# Patient Record
Sex: MALE | Race: ASIAN | Employment: FULL TIME | URBAN - METROPOLITAN AREA
[De-identification: names, ages, dates, MRNs, and addresses within clinical notes are randomized per-mention and may not be internally consistent; named-entity substitution may affect disease eponyms.]

---

## 2023-12-15 ENCOUNTER — APPOINTMENT (OUTPATIENT)
Dept: GENERAL RADIOLOGY | Age: 53
DRG: 720 | End: 2023-12-15
Payer: MEDICAID

## 2023-12-15 ENCOUNTER — APPOINTMENT (OUTPATIENT)
Dept: CT IMAGING | Age: 53
DRG: 720 | End: 2023-12-15
Payer: MEDICAID

## 2023-12-15 ENCOUNTER — HOSPITAL ENCOUNTER (INPATIENT)
Age: 53
LOS: 3 days | Discharge: HOME | DRG: 720 | End: 2023-12-18
Attending: EMERGENCY MEDICINE | Admitting: INTERNAL MEDICINE
Payer: MEDICAID

## 2023-12-15 DIAGNOSIS — J10.1 INFLUENZA A: ICD-10-CM

## 2023-12-15 DIAGNOSIS — J96.01 ACUTE RESPIRATORY FAILURE WITH HYPOXIA AND HYPERCAPNIA (HCC): Primary | ICD-10-CM

## 2023-12-15 DIAGNOSIS — J96.02 ACUTE RESPIRATORY FAILURE WITH HYPOXIA AND HYPERCAPNIA (HCC): Primary | ICD-10-CM

## 2023-12-15 PROBLEM — J96.00 ACUTE RESPIRATORY FAILURE (HCC): Status: ACTIVE | Noted: 2023-12-15

## 2023-12-15 LAB
ALBUMIN SERPL-MCNC: 4.2 G/DL (ref 3.5–5.2)
ALP SERPL-CCNC: 106 U/L (ref 40–129)
ALT SERPL-CCNC: 51 U/L (ref 5–41)
ANION GAP SERPL CALCULATED.3IONS-SCNC: 13 MMOL/L (ref 9–17)
ARTERIAL PATENCY WRIST A: ABNORMAL
AST SERPL-CCNC: 32 U/L
BASOPHILS # BLD: 0.14 K/UL (ref 0–0.2)
BASOPHILS NFR BLD: 1 % (ref 0–2)
BDY SITE: ABNORMAL
BILIRUB SERPL-MCNC: 0.6 MG/DL (ref 0.3–1.2)
BNP SERPL-MCNC: 53 PG/ML
BODY TEMPERATURE: 37
BUN SERPL-MCNC: 16 MG/DL (ref 6–20)
CALCIUM SERPL-MCNC: 8.9 MG/DL (ref 8.6–10.4)
CHLORIDE SERPL-SCNC: 97 MMOL/L (ref 98–107)
CO2 SERPL-SCNC: 25 MMOL/L (ref 20–31)
COHGB MFR BLD: 1.1 % (ref 0–5)
CREAT SERPL-MCNC: 1.2 MG/DL (ref 0.7–1.2)
EOSINOPHIL # BLD: 0 K/UL (ref 0–0.4)
EOSINOPHILS RELATIVE PERCENT: 0 % (ref 0–4)
ERYTHROCYTE [DISTWIDTH] IN BLOOD BY AUTOMATED COUNT: 14.1 % (ref 11.5–14.9)
FLUAV RNA RESP QL NAA+PROBE: DETECTED
FLUBV RNA RESP QL NAA+PROBE: NOT DETECTED
GAS FLOW.O2 O2 DELIVERY SYS: ABNORMAL L/MIN
GFR SERPL CREATININE-BSD FRML MDRD: >60 ML/MIN/1.73M2
GLUCOSE SERPL-MCNC: 111 MG/DL (ref 70–99)
HCO3 ARTERIAL: 25 MMOL/L (ref 22–26)
HCT VFR BLD AUTO: 45.1 % (ref 41–53)
HGB BLD-MCNC: 14.5 G/DL (ref 13.5–17.5)
LACTATE BLDV-SCNC: 1.5 MMOL/L (ref 0.5–1.9)
LYMPHOCYTES NFR BLD: 0.55 K/UL (ref 1–4.8)
LYMPHOCYTES RELATIVE PERCENT: 4 % (ref 24–44)
MCH RBC QN AUTO: 26.3 PG (ref 26–34)
MCHC RBC AUTO-ENTMCNC: 32.2 G/DL (ref 31–37)
MCV RBC AUTO: 81.6 FL (ref 80–100)
METHEMOGLOBIN: 0.6 % (ref 0–1.9)
MONOCYTES NFR BLD: 1.24 K/UL (ref 0.1–1.3)
MONOCYTES NFR BLD: 9 % (ref 1–7)
MORPHOLOGY: NORMAL
NEGATIVE BASE EXCESS, ART: 1 MMOL/L (ref 0–2)
NEUTROPHILS NFR BLD: 86 % (ref 36–66)
NEUTS SEG NFR BLD: 11.87 K/UL (ref 1.3–9.1)
O2 SAT, ARTERIAL: 89.5 % (ref 95–98)
PCO2 ARTERIAL: 48.2 MMHG (ref 35–45)
PH ARTERIAL: 7.32 (ref 7.35–7.45)
PLATELET # BLD AUTO: 299 K/UL (ref 150–450)
PMV BLD AUTO: 7.4 FL (ref 6–12)
PO2 ARTERIAL: 60.9 MMHG (ref 80–100)
POTASSIUM SERPL-SCNC: 4.4 MMOL/L (ref 3.7–5.3)
PROCALCITONIN SERPL-MCNC: 0.19 NG/ML
PROT SERPL-MCNC: 7.8 G/DL (ref 6.4–8.3)
PT. POSITION: ABNORMAL
RBC # BLD AUTO: 5.53 M/UL (ref 4.5–5.9)
RESPIRATORY RATE: 26
SARS-COV-2 RNA RESP QL NAA+PROBE: NOT DETECTED
SODIUM SERPL-SCNC: 135 MMOL/L (ref 135–144)
SOURCE: ABNORMAL
SPECIMEN DESCRIPTION: ABNORMAL
TROPONIN I SERPL HS-MCNC: 8 NG/L (ref 0–22)
TROPONIN I SERPL HS-MCNC: 8 NG/L (ref 0–22)
WBC OTHER # BLD: 13.8 K/UL (ref 3.5–11)

## 2023-12-15 PROCEDURE — 6360000002 HC RX W HCPCS: Performed by: INTERNAL MEDICINE

## 2023-12-15 PROCEDURE — 82805 BLOOD GASES W/O2 SATURATION: CPT

## 2023-12-15 PROCEDURE — 2700000000 HC OXYGEN THERAPY PER DAY

## 2023-12-15 PROCEDURE — 84484 ASSAY OF TROPONIN QUANT: CPT

## 2023-12-15 PROCEDURE — 96375 TX/PRO/DX INJ NEW DRUG ADDON: CPT

## 2023-12-15 PROCEDURE — 83605 ASSAY OF LACTIC ACID: CPT

## 2023-12-15 PROCEDURE — 2580000003 HC RX 258: Performed by: INTERNAL MEDICINE

## 2023-12-15 PROCEDURE — 6370000000 HC RX 637 (ALT 250 FOR IP): Performed by: EMERGENCY MEDICINE

## 2023-12-15 PROCEDURE — 6360000002 HC RX W HCPCS: Performed by: EMERGENCY MEDICINE

## 2023-12-15 PROCEDURE — 96365 THER/PROPH/DIAG IV INF INIT: CPT

## 2023-12-15 PROCEDURE — 2060000000 HC ICU INTERMEDIATE R&B

## 2023-12-15 PROCEDURE — 80053 COMPREHEN METABOLIC PANEL: CPT

## 2023-12-15 PROCEDURE — 87040 BLOOD CULTURE FOR BACTERIA: CPT

## 2023-12-15 PROCEDURE — 81001 URINALYSIS AUTO W/SCOPE: CPT

## 2023-12-15 PROCEDURE — 87636 SARSCOV2 & INF A&B AMP PRB: CPT

## 2023-12-15 PROCEDURE — 85025 COMPLETE CBC W/AUTO DIFF WBC: CPT

## 2023-12-15 PROCEDURE — 99285 EMERGENCY DEPT VISIT HI MDM: CPT

## 2023-12-15 PROCEDURE — 84145 PROCALCITONIN (PCT): CPT

## 2023-12-15 PROCEDURE — 36415 COLL VENOUS BLD VENIPUNCTURE: CPT

## 2023-12-15 PROCEDURE — 2580000003 HC RX 258: Performed by: EMERGENCY MEDICINE

## 2023-12-15 PROCEDURE — 71260 CT THORAX DX C+: CPT

## 2023-12-15 PROCEDURE — 6360000004 HC RX CONTRAST MEDICATION: Performed by: EMERGENCY MEDICINE

## 2023-12-15 PROCEDURE — 36600 WITHDRAWAL OF ARTERIAL BLOOD: CPT

## 2023-12-15 PROCEDURE — 83880 ASSAY OF NATRIURETIC PEPTIDE: CPT

## 2023-12-15 PROCEDURE — 94640 AIRWAY INHALATION TREATMENT: CPT

## 2023-12-15 PROCEDURE — 71045 X-RAY EXAM CHEST 1 VIEW: CPT

## 2023-12-15 RX ORDER — ACETAMINOPHEN 500 MG
1000 TABLET ORAL ONCE
Status: COMPLETED | OUTPATIENT
Start: 2023-12-15 | End: 2023-12-15

## 2023-12-15 RX ORDER — ONDANSETRON 4 MG/1
4 TABLET, ORALLY DISINTEGRATING ORAL EVERY 8 HOURS PRN
Status: DISCONTINUED | OUTPATIENT
Start: 2023-12-15 | End: 2023-12-18 | Stop reason: HOSPADM

## 2023-12-15 RX ORDER — ACETAMINOPHEN 650 MG/1
650 SUPPOSITORY RECTAL EVERY 6 HOURS PRN
Status: DISCONTINUED | OUTPATIENT
Start: 2023-12-15 | End: 2023-12-18 | Stop reason: HOSPADM

## 2023-12-15 RX ORDER — SODIUM CHLORIDE 9 MG/ML
INJECTION, SOLUTION INTRAVENOUS CONTINUOUS
Status: ACTIVE | OUTPATIENT
Start: 2023-12-15 | End: 2023-12-17

## 2023-12-15 RX ORDER — LANOLIN ALCOHOL/MO/W.PET/CERES
400 CREAM (GRAM) TOPICAL DAILY
COMMUNITY

## 2023-12-15 RX ORDER — ALBUTEROL SULFATE 2.5 MG/3ML
2.5 SOLUTION RESPIRATORY (INHALATION)
Status: DISCONTINUED | OUTPATIENT
Start: 2023-12-15 | End: 2023-12-18 | Stop reason: HOSPADM

## 2023-12-15 RX ORDER — SODIUM CHLORIDE 9 MG/ML
INJECTION, SOLUTION INTRAVENOUS PRN
Status: CANCELLED | OUTPATIENT
Start: 2023-12-15

## 2023-12-15 RX ORDER — ENOXAPARIN SODIUM 100 MG/ML
30 INJECTION SUBCUTANEOUS 2 TIMES DAILY
Status: DISCONTINUED | OUTPATIENT
Start: 2023-12-15 | End: 2023-12-18 | Stop reason: HOSPADM

## 2023-12-15 RX ORDER — OSELTAMIVIR PHOSPHATE 75 MG/1
75 CAPSULE ORAL 2 TIMES DAILY
Status: DISCONTINUED | OUTPATIENT
Start: 2023-12-15 | End: 2023-12-18 | Stop reason: HOSPADM

## 2023-12-15 RX ORDER — MAGNESIUM SULFATE 1 G/100ML
1000 INJECTION INTRAVENOUS PRN
Status: DISCONTINUED | OUTPATIENT
Start: 2023-12-15 | End: 2023-12-18 | Stop reason: HOSPADM

## 2023-12-15 RX ORDER — 0.9 % SODIUM CHLORIDE 0.9 %
100 INTRAVENOUS SOLUTION INTRAVENOUS ONCE
Status: COMPLETED | OUTPATIENT
Start: 2023-12-15 | End: 2023-12-15

## 2023-12-15 RX ORDER — ACETAMINOPHEN 325 MG/1
650 TABLET ORAL EVERY 6 HOURS PRN
Status: CANCELLED | OUTPATIENT
Start: 2023-12-15

## 2023-12-15 RX ORDER — SODIUM CHLORIDE 0.9 % (FLUSH) 0.9 %
5-40 SYRINGE (ML) INJECTION EVERY 12 HOURS SCHEDULED
Status: DISCONTINUED | OUTPATIENT
Start: 2023-12-15 | End: 2023-12-18 | Stop reason: HOSPADM

## 2023-12-15 RX ORDER — ACETAMINOPHEN 325 MG/1
650 TABLET ORAL EVERY 6 HOURS PRN
Status: DISCONTINUED | OUTPATIENT
Start: 2023-12-15 | End: 2023-12-18 | Stop reason: HOSPADM

## 2023-12-15 RX ORDER — ACETAMINOPHEN 650 MG/1
650 SUPPOSITORY RECTAL EVERY 6 HOURS PRN
Status: CANCELLED | OUTPATIENT
Start: 2023-12-15

## 2023-12-15 RX ORDER — ONDANSETRON 2 MG/ML
4 INJECTION INTRAMUSCULAR; INTRAVENOUS EVERY 6 HOURS PRN
Status: DISCONTINUED | OUTPATIENT
Start: 2023-12-15 | End: 2023-12-18 | Stop reason: HOSPADM

## 2023-12-15 RX ORDER — POLYETHYLENE GLYCOL 3350 17 G/17G
17 POWDER, FOR SOLUTION ORAL DAILY PRN
Status: DISCONTINUED | OUTPATIENT
Start: 2023-12-15 | End: 2023-12-18 | Stop reason: HOSPADM

## 2023-12-15 RX ORDER — POTASSIUM CHLORIDE 20 MEQ/1
40 TABLET, EXTENDED RELEASE ORAL PRN
Status: DISCONTINUED | OUTPATIENT
Start: 2023-12-15 | End: 2023-12-18 | Stop reason: HOSPADM

## 2023-12-15 RX ORDER — SODIUM CHLORIDE 0.9 % (FLUSH) 0.9 %
10 SYRINGE (ML) INJECTION PRN
Status: DISCONTINUED | OUTPATIENT
Start: 2023-12-15 | End: 2023-12-18 | Stop reason: HOSPADM

## 2023-12-15 RX ORDER — SODIUM CHLORIDE 9 MG/ML
INJECTION, SOLUTION INTRAVENOUS PRN
Status: DISCONTINUED | OUTPATIENT
Start: 2023-12-15 | End: 2023-12-18 | Stop reason: HOSPADM

## 2023-12-15 RX ORDER — POTASSIUM CHLORIDE 7.45 MG/ML
10 INJECTION INTRAVENOUS PRN
Status: DISCONTINUED | OUTPATIENT
Start: 2023-12-15 | End: 2023-12-18 | Stop reason: HOSPADM

## 2023-12-15 RX ORDER — LANOLIN ALCOHOL/MO/W.PET/CERES
400 CREAM (GRAM) TOPICAL DAILY
Status: CANCELLED | OUTPATIENT
Start: 2023-12-15

## 2023-12-15 RX ORDER — ALBUTEROL SULFATE 2.5 MG/3ML
2.5 SOLUTION RESPIRATORY (INHALATION) EVERY 4 HOURS
Status: DISCONTINUED | OUTPATIENT
Start: 2023-12-16 | End: 2023-12-16

## 2023-12-15 RX ORDER — SODIUM CHLORIDE 0.9 % (FLUSH) 0.9 %
5-40 SYRINGE (ML) INJECTION EVERY 12 HOURS SCHEDULED
Status: CANCELLED | OUTPATIENT
Start: 2023-12-15

## 2023-12-15 RX ORDER — SODIUM CHLORIDE 0.9 % (FLUSH) 0.9 %
5-40 SYRINGE (ML) INJECTION PRN
Status: CANCELLED | OUTPATIENT
Start: 2023-12-15

## 2023-12-15 RX ORDER — ENOXAPARIN SODIUM 100 MG/ML
30 INJECTION SUBCUTANEOUS 2 TIMES DAILY
Status: CANCELLED | OUTPATIENT
Start: 2023-12-15

## 2023-12-15 RX ORDER — 0.9 % SODIUM CHLORIDE 0.9 %
30 INTRAVENOUS SOLUTION INTRAVENOUS ONCE
Status: COMPLETED | OUTPATIENT
Start: 2023-12-15 | End: 2023-12-15

## 2023-12-15 RX ADMIN — SODIUM CHLORIDE: 9 INJECTION, SOLUTION INTRAVENOUS at 22:04

## 2023-12-15 RX ADMIN — CEFEPIME 2000 MG: 2 INJECTION, POWDER, FOR SOLUTION INTRAVENOUS at 17:45

## 2023-12-15 RX ADMIN — SODIUM CHLORIDE 100 ML: 9 INJECTION, SOLUTION INTRAVENOUS at 16:35

## 2023-12-15 RX ADMIN — SODIUM CHLORIDE, PRESERVATIVE FREE 10 ML: 5 INJECTION INTRAVENOUS at 16:35

## 2023-12-15 RX ADMIN — OSELTAMIVIR PHOSPHATE 75 MG: 75 CAPSULE ORAL at 20:54

## 2023-12-15 RX ADMIN — ENOXAPARIN SODIUM 30 MG: 100 INJECTION SUBCUTANEOUS at 22:04

## 2023-12-15 RX ADMIN — SODIUM CHLORIDE, PRESERVATIVE FREE 10 ML: 5 INJECTION INTRAVENOUS at 22:05

## 2023-12-15 RX ADMIN — VANCOMYCIN HYDROCHLORIDE 2500 MG: 1 INJECTION, POWDER, LYOPHILIZED, FOR SOLUTION INTRAVENOUS at 18:22

## 2023-12-15 RX ADMIN — ALBUTEROL SULFATE 2.5 MG: 2.5 SOLUTION RESPIRATORY (INHALATION) at 22:29

## 2023-12-15 RX ADMIN — IOPAMIDOL 75 ML: 755 INJECTION, SOLUTION INTRAVENOUS at 16:35

## 2023-12-15 RX ADMIN — SODIUM CHLORIDE 1923 ML: 9 INJECTION, SOLUTION INTRAVENOUS at 15:43

## 2023-12-15 RX ADMIN — ACETAMINOPHEN 1000 MG: 500 TABLET ORAL at 16:11

## 2023-12-15 NOTE — ED NOTES
Patient reports SOB, fever, body aches, and lost of taste since this morning. Patient was tachycardic, febrile, and has audible wheezes and accessory muscle use.  Patient alert and oriented at this time GCS 13     Angelic Trinidad  12/15/23 8704

## 2023-12-16 ENCOUNTER — APPOINTMENT (OUTPATIENT)
Dept: GENERAL RADIOLOGY | Age: 53
DRG: 720 | End: 2023-12-16
Payer: MEDICAID

## 2023-12-16 LAB
ALBUMIN SERPL-MCNC: 4 G/DL (ref 3.5–5.2)
ALP SERPL-CCNC: 93 U/L (ref 40–129)
ALT SERPL-CCNC: 55 U/L (ref 5–41)
ANION GAP SERPL CALCULATED.3IONS-SCNC: 9 MMOL/L (ref 9–17)
AST SERPL-CCNC: 35 U/L
B PARAP IS1001 DNA NPH QL NAA+NON-PROBE: NOT DETECTED
B PERT DNA SPEC QL NAA+PROBE: NOT DETECTED
BACTERIA URNS QL MICRO: ABNORMAL
BASOPHILS # BLD: 0.1 K/UL (ref 0–0.2)
BASOPHILS NFR BLD: 1 % (ref 0–2)
BILIRUB SERPL-MCNC: 0.4 MG/DL (ref 0.3–1.2)
BILIRUB UR QL STRIP: NEGATIVE
BUN SERPL-MCNC: 15 MG/DL (ref 6–20)
C PNEUM DNA NPH QL NAA+NON-PROBE: NOT DETECTED
CALCIUM SERPL-MCNC: 8.4 MG/DL (ref 8.6–10.4)
CASTS #/AREA URNS LPF: ABNORMAL /LPF
CHLORIDE SERPL-SCNC: 103 MMOL/L (ref 98–107)
CLARITY UR: CLEAR
CO2 SERPL-SCNC: 28 MMOL/L (ref 20–31)
COLOR UR: YELLOW
CREAT SERPL-MCNC: 0.9 MG/DL (ref 0.7–1.2)
EOSINOPHIL # BLD: 0 K/UL (ref 0–0.4)
EOSINOPHILS RELATIVE PERCENT: 0 % (ref 0–4)
EPI CELLS #/AREA URNS HPF: ABNORMAL /HPF
ERYTHROCYTE [DISTWIDTH] IN BLOOD BY AUTOMATED COUNT: 14.2 % (ref 11.5–14.9)
FLUAV H3 RNA NPH QL NAA+NON-PROBE: DETECTED
FLUAV RNA NPH QL NAA+NON-PROBE: DETECTED
FLUBV RNA NPH QL NAA+NON-PROBE: NOT DETECTED
GFR SERPL CREATININE-BSD FRML MDRD: >60 ML/MIN/1.73M2
GLUCOSE SERPL-MCNC: 102 MG/DL (ref 70–99)
GLUCOSE UR STRIP-MCNC: NEGATIVE MG/DL
HADV DNA NPH QL NAA+NON-PROBE: NOT DETECTED
HCOV 229E RNA NPH QL NAA+NON-PROBE: NOT DETECTED
HCOV HKU1 RNA NPH QL NAA+NON-PROBE: NOT DETECTED
HCOV NL63 RNA NPH QL NAA+NON-PROBE: NOT DETECTED
HCOV OC43 RNA NPH QL NAA+NON-PROBE: NOT DETECTED
HCT VFR BLD AUTO: 41.2 % (ref 41–53)
HGB BLD-MCNC: 13.5 G/DL (ref 13.5–17.5)
HGB UR QL STRIP.AUTO: ABNORMAL
HMPV RNA NPH QL NAA+NON-PROBE: NOT DETECTED
HPIV1 RNA NPH QL NAA+NON-PROBE: NOT DETECTED
HPIV2 RNA NPH QL NAA+NON-PROBE: NOT DETECTED
HPIV3 RNA NPH QL NAA+NON-PROBE: NOT DETECTED
HPIV4 RNA NPH QL NAA+NON-PROBE: NOT DETECTED
INR PPP: 1.1
KETONES UR STRIP-MCNC: ABNORMAL MG/DL
L PNEUMO1 AG UR QL IA.RAPID: NEGATIVE
LEUKOCYTE ESTERASE UR QL STRIP: NEGATIVE
LYMPHOCYTES NFR BLD: 0.7 K/UL (ref 1–4.8)
LYMPHOCYTES RELATIVE PERCENT: 6 % (ref 24–44)
M PNEUMO DNA NPH QL NAA+NON-PROBE: NOT DETECTED
MCH RBC QN AUTO: 26.9 PG (ref 26–34)
MCHC RBC AUTO-ENTMCNC: 32.9 G/DL (ref 31–37)
MCV RBC AUTO: 81.9 FL (ref 80–100)
MONOCYTES NFR BLD: 1.6 K/UL (ref 0.1–1.3)
MONOCYTES NFR BLD: 14 % (ref 1–7)
NEUTROPHILS NFR BLD: 79 % (ref 36–66)
NEUTS SEG NFR BLD: 9.2 K/UL (ref 1.3–9.1)
NITRITE UR QL STRIP: NEGATIVE
PH UR STRIP: 5 [PH] (ref 5–8)
PLATELET # BLD AUTO: 259 K/UL (ref 150–450)
PMV BLD AUTO: 7.6 FL (ref 6–12)
POTASSIUM SERPL-SCNC: 4.2 MMOL/L (ref 3.7–5.3)
PROT SERPL-MCNC: 7.3 G/DL (ref 6.4–8.3)
PROT UR STRIP-MCNC: ABNORMAL MG/DL
PROTHROMBIN TIME: 14.5 SEC (ref 11.8–14.6)
RBC # BLD AUTO: 5.03 M/UL (ref 4.5–5.9)
RBC #/AREA URNS HPF: ABNORMAL /HPF
RSV ANTIGEN: NEGATIVE
RSV RNA NPH QL NAA+NON-PROBE: NOT DETECTED
RV+EV RNA NPH QL NAA+NON-PROBE: NOT DETECTED
S PNEUM AG SPEC QL: NEGATIVE
SARS-COV-2 RNA NPH QL NAA+NON-PROBE: NOT DETECTED
SODIUM SERPL-SCNC: 140 MMOL/L (ref 135–144)
SP GR UR STRIP: 1.03 (ref 1–1.03)
SPECIMEN DESCRIPTION: ABNORMAL
SPECIMEN SOURCE: NORMAL
SPECIMEN SOURCE: NORMAL
TSH SERPL DL<=0.05 MIU/L-ACNC: 0.5 UIU/ML (ref 0.3–5)
UROBILINOGEN UR STRIP-ACNC: NORMAL EU/DL (ref 0–1)
WBC #/AREA URNS HPF: ABNORMAL /HPF
WBC OTHER # BLD: 11.6 K/UL (ref 3.5–11)

## 2023-12-16 PROCEDURE — 94761 N-INVAS EAR/PLS OXIMETRY MLT: CPT

## 2023-12-16 PROCEDURE — 86738 MYCOPLASMA ANTIBODY: CPT

## 2023-12-16 PROCEDURE — 6360000002 HC RX W HCPCS: Performed by: INTERNAL MEDICINE

## 2023-12-16 PROCEDURE — 85610 PROTHROMBIN TIME: CPT

## 2023-12-16 PROCEDURE — 87899 AGENT NOS ASSAY W/OPTIC: CPT

## 2023-12-16 PROCEDURE — 94664 DEMO&/EVAL PT USE INHALER: CPT

## 2023-12-16 PROCEDURE — 93005 ELECTROCARDIOGRAM TRACING: CPT | Performed by: INTERNAL MEDICINE

## 2023-12-16 PROCEDURE — 87070 CULTURE OTHR SPECIMN AEROBIC: CPT

## 2023-12-16 PROCEDURE — 87449 NOS EACH ORGANISM AG IA: CPT

## 2023-12-16 PROCEDURE — 2580000003 HC RX 258: Performed by: INTERNAL MEDICINE

## 2023-12-16 PROCEDURE — 71045 X-RAY EXAM CHEST 1 VIEW: CPT

## 2023-12-16 PROCEDURE — 87807 RSV ASSAY W/OPTIC: CPT

## 2023-12-16 PROCEDURE — 87205 SMEAR GRAM STAIN: CPT

## 2023-12-16 PROCEDURE — 99223 1ST HOSP IP/OBS HIGH 75: CPT | Performed by: INTERNAL MEDICINE

## 2023-12-16 PROCEDURE — 6370000000 HC RX 637 (ALT 250 FOR IP): Performed by: INTERNAL MEDICINE

## 2023-12-16 PROCEDURE — 87641 MR-STAPH DNA AMP PROBE: CPT

## 2023-12-16 PROCEDURE — 2060000000 HC ICU INTERMEDIATE R&B

## 2023-12-16 PROCEDURE — 2580000003 HC RX 258

## 2023-12-16 PROCEDURE — 2700000000 HC OXYGEN THERAPY PER DAY

## 2023-12-16 PROCEDURE — 85025 COMPLETE CBC W/AUTO DIFF WBC: CPT

## 2023-12-16 PROCEDURE — 84443 ASSAY THYROID STIM HORMONE: CPT

## 2023-12-16 PROCEDURE — 94640 AIRWAY INHALATION TREATMENT: CPT

## 2023-12-16 PROCEDURE — 6370000000 HC RX 637 (ALT 250 FOR IP): Performed by: EMERGENCY MEDICINE

## 2023-12-16 PROCEDURE — 6370000000 HC RX 637 (ALT 250 FOR IP)

## 2023-12-16 PROCEDURE — 80053 COMPREHEN METABOLIC PANEL: CPT

## 2023-12-16 PROCEDURE — 0202U NFCT DS 22 TRGT SARS-COV-2: CPT

## 2023-12-16 PROCEDURE — 36415 COLL VENOUS BLD VENIPUNCTURE: CPT

## 2023-12-16 RX ORDER — HYDROCHLOROTHIAZIDE 25 MG/1
25 TABLET ORAL DAILY
Status: DISCONTINUED | OUTPATIENT
Start: 2023-12-16 | End: 2023-12-16

## 2023-12-16 RX ORDER — HYDROXYZINE HYDROCHLORIDE 10 MG/1
10 TABLET, FILM COATED ORAL 3 TIMES DAILY PRN
Status: DISCONTINUED | OUTPATIENT
Start: 2023-12-16 | End: 2023-12-18 | Stop reason: HOSPADM

## 2023-12-16 RX ORDER — HYDRALAZINE HYDROCHLORIDE 20 MG/ML
5 INJECTION INTRAMUSCULAR; INTRAVENOUS EVERY 6 HOURS PRN
Status: DISCONTINUED | OUTPATIENT
Start: 2023-12-16 | End: 2023-12-18 | Stop reason: HOSPADM

## 2023-12-16 RX ORDER — CARVEDILOL 6.25 MG/1
6.25 TABLET ORAL 2 TIMES DAILY WITH MEALS
Status: DISCONTINUED | OUTPATIENT
Start: 2023-12-16 | End: 2023-12-18 | Stop reason: HOSPADM

## 2023-12-16 RX ORDER — ALBUTEROL SULFATE 2.5 MG/3ML
2.5 SOLUTION RESPIRATORY (INHALATION)
Status: DISCONTINUED | OUTPATIENT
Start: 2023-12-16 | End: 2023-12-18 | Stop reason: HOSPADM

## 2023-12-16 RX ADMIN — SODIUM CHLORIDE, PRESERVATIVE FREE 10 ML: 5 INJECTION INTRAVENOUS at 08:26

## 2023-12-16 RX ADMIN — ALBUTEROL SULFATE 2.5 MG: 2.5 SOLUTION RESPIRATORY (INHALATION) at 10:45

## 2023-12-16 RX ADMIN — OSELTAMIVIR PHOSPHATE 75 MG: 75 CAPSULE ORAL at 20:39

## 2023-12-16 RX ADMIN — CARVEDILOL 6.25 MG: 6.25 TABLET, FILM COATED ORAL at 10:51

## 2023-12-16 RX ADMIN — ENOXAPARIN SODIUM 30 MG: 100 INJECTION SUBCUTANEOUS at 08:26

## 2023-12-16 RX ADMIN — ACETAMINOPHEN 650 MG: 325 TABLET ORAL at 20:39

## 2023-12-16 RX ADMIN — ALBUTEROL SULFATE 2.5 MG: 2.5 SOLUTION RESPIRATORY (INHALATION) at 00:00

## 2023-12-16 RX ADMIN — ALBUTEROL SULFATE 2.5 MG: 2.5 SOLUTION RESPIRATORY (INHALATION) at 08:34

## 2023-12-16 RX ADMIN — SODIUM CHLORIDE: 9 INJECTION, SOLUTION INTRAVENOUS at 18:14

## 2023-12-16 RX ADMIN — ALBUTEROL SULFATE 2.5 MG: 2.5 SOLUTION RESPIRATORY (INHALATION) at 13:08

## 2023-12-16 RX ADMIN — ALBUTEROL SULFATE 2.5 MG: 2.5 SOLUTION RESPIRATORY (INHALATION) at 03:52

## 2023-12-16 RX ADMIN — CARVEDILOL 6.25 MG: 6.25 TABLET, FILM COATED ORAL at 17:28

## 2023-12-16 RX ADMIN — HYDROXYZINE HYDROCHLORIDE 10 MG: 10 TABLET ORAL at 18:05

## 2023-12-16 RX ADMIN — ALBUTEROL SULFATE 2.5 MG: 2.5 SOLUTION RESPIRATORY (INHALATION) at 20:05

## 2023-12-16 RX ADMIN — VANCOMYCIN HYDROCHLORIDE 1000 MG: 1 INJECTION, POWDER, LYOPHILIZED, FOR SOLUTION INTRAVENOUS at 08:29

## 2023-12-16 RX ADMIN — CEFEPIME 2000 MG: 2 INJECTION, POWDER, FOR SOLUTION INTRAVENOUS at 10:52

## 2023-12-16 RX ADMIN — ENOXAPARIN SODIUM 30 MG: 100 INJECTION SUBCUTANEOUS at 20:40

## 2023-12-16 RX ADMIN — OSELTAMIVIR PHOSPHATE 75 MG: 75 CAPSULE ORAL at 09:21

## 2023-12-16 RX ADMIN — ALBUTEROL SULFATE 2.5 MG: 2.5 SOLUTION RESPIRATORY (INHALATION) at 15:51

## 2023-12-16 RX ADMIN — CEFEPIME 2000 MG: 2 INJECTION, POWDER, FOR SOLUTION INTRAVENOUS at 02:00

## 2023-12-16 NOTE — PROGRESS NOTES
Physical Therapy Cancel Note      DATE: 2023    NAME: Ryan Link  MRN: [id number]   : 1970      Patient not seen this date for Physical Therapy due to: Other: Hold due to current medical status -gets extremely SOB with activity.       Electronically signed by Jm Lizarraga PT DPT on 2023 at 3:00 PM

## 2023-12-16 NOTE — H&P
4.5 - 5.9 m/uL    Hemoglobin 14.5 13.5 - 17.5 g/dL    Hematocrit 45.1 41 - 53 %    MCV 81.6 80 - 100 fL    MCH 26.3 26 - 34 pg    MCHC 32.2 31 - 37 g/dL    RDW 14.1 11.5 - 14.9 %    Platelets 868 985 - 633 k/uL    MPV 7.4 6.0 - 12.0 fL    Neutrophils % 86 (H) 36 - 66 %    Lymphocytes % 4 (L) 24 - 44 %    Monocytes % 9 (H) 1 - 7 %    Eosinophils % 0 0 - 4 %    Basophils % 1 0 - 2 %    Neutrophils Absolute 11.87 (H) 1.3 - 9.1 k/uL    Lymphocytes Absolute 0.55 (L) 1.0 - 4.8 k/uL    Monocytes Absolute 1.24 0.1 - 1.3 k/uL    Eosinophils Absolute 0.00 0.0 - 0.4 k/uL    Basophils Absolute 0.14 0.0 - 0.2 k/uL    Morphology Normal    Comprehensive Metabolic Panel    Collection Time: 12/15/23  3:21 PM   Result Value Ref Range    Sodium 135 135 - 144 mmol/L    Potassium 4.4 3.7 - 5.3 mmol/L    Chloride 97 (L) 98 - 107 mmol/L    CO2 25 20 - 31 mmol/L    Anion Gap 13 9 - 17 mmol/L    Glucose 111 (H) 70 - 99 mg/dL    BUN 16 6 - 20 mg/dL    Creatinine 1.2 0.7 - 1.2 mg/dL    Est, Glom Filt Rate >60 >60 mL/min/1.73m2    Calcium 8.9 8.6 - 10.4 mg/dL    Total Protein 7.8 6.4 - 8.3 g/dL    Albumin 4.2 3.5 - 5.2 g/dL    Total Bilirubin 0.6 0.3 - 1.2 mg/dL    Alkaline Phosphatase 106 40 - 129 U/L    ALT 51 (H) 5 - 41 U/L    AST 32 <40 U/L   Procalcitonin    Collection Time: 12/15/23  3:21 PM   Result Value Ref Range    Procalcitonin 0.19 (H) <0.09 ng/mL   Troponin    Collection Time: 12/15/23  3:21 PM   Result Value Ref Range    Troponin, High Sensitivity 8 0 - 22 ng/L   Lactate, Sepsis    Collection Time: 12/15/23  3:22 PM   Result Value Ref Range    Lactic Acid, Sepsis 1.5 0.5 - 1.9 mmol/L   Culture, Blood 2    Collection Time: 12/15/23  3:35 PM    Specimen: Blood   Result Value Ref Range    Specimen Description . BLOOD RIGHT HAND     Special Requests          Culture NO GROWTH 12 HOURS    COVID-19 & Influenza Combo    Collection Time: 12/15/23  3:35 PM    Specimen: Nasopharyngeal Swab   Result Value Ref Range    Specimen Description [facility] NASOPHARYNGEAL SWAB     Source . NASOPHARYNGEAL SWAB     SARS-CoV-2 RNA, RT PCR Not Detected Not Detected    INFLUENZA A DETECTED (A) Not Detected    INFLUENZA B Not Detected Not Detected   Troponin    Collection Time: 12/15/23  4:57 PM   Result Value Ref Range    Troponin, High Sensitivity 8 0 - 22 ng/L   Brain Natriuretic Peptide    Collection Time: 12/15/23  4:57 PM   Result Value Ref Range    Pro-BNP 53 <300 pg/mL   Arterial Blood Gases    Collection Time: 12/15/23  5:20 PM   Result Value Ref Range    pH, Arterial 7.324 (L) 7.350 - 7.450    pCO2, Arterial 48.2 (HH) 35.0 - 45.0 mmHg    pO2, Arterial 60.9 (L) 80.0 - 100.0 mmHg    HCO3, Arterial 25.0 22.0 - 26.0 mmol/L    Negative Base Excess, Art 1.0 0.0 - 2.0 mmol/L    O2 Sat, Arterial 89.5 (L) 95 - 98 %    Carboxyhemoglobin 1.1 0 - 5 %    Methemoglobin 0.6 0.0 - 1.9 %    Pt Temp 37     O2 Device/Flow/% CANNULA 6LPM     Respiratory Rate 26     Jose C Test PASS     Sample Site Right Radial Artery     Pt.  Position SEMI-FOWLERS    Protime-INR    Collection Time: 12/16/23  4:05 AM   Result Value Ref Range    Protime 14.5 11.8 - 14.6 sec    INR 1.1    CBC with Auto Differential    Collection Time: 12/16/23  4:05 AM   Result Value Ref Range    WBC 11.6 (H) 3.5 - 11.0 k/uL    RBC 5.03 4.5 - 5.9 m/uL    Hemoglobin 13.5 13.5 - 17.5 g/dL    Hematocrit 41.2 41 - 53 %    MCV 81.9 80 - 100 fL    MCH 26.9 26 - 34 pg    MCHC 32.9 31 - 37 g/dL    RDW 14.2 11.5 - 14.9 %    Platelets 331 965 - 328 k/uL    MPV 7.6 6.0 - 12.0 fL    Neutrophils % 79 (H) 36 - 66 %    Lymphocytes % 6 (L) 24 - 44 %    Monocytes % 14 (H) 1 - 7 %    Eosinophils % 0 0 - 4 %    Basophils % 1 0 - 2 %    Neutrophils Absolute 9.20 (H) 1.3 - 9.1 k/uL    Lymphocytes Absolute 0.70 (L) 1.0 - 4.8 k/uL    Monocytes Absolute 1.60 (H) 0.1 - 1.3 k/uL    Eosinophils Absolute 0.00 0.0 - 0.4 k/uL    Basophils Absolute 0.10 0.0 - 0.2 k/uL   Comprehensive Metabolic Panel    Collection Time: 12/16/23  4:05 AM   Result Value

## 2023-12-16 NOTE — CONSULTS
[facility]   CONSULT NOTE:      DATE OF CONSULT 12/16/2023    REASON FOR CONSULTATION:  Shortness of breath      PCP No primary care provider on file. CHIEF COMPLAINT: I have been sick since Thursday    HISTORY OF PRESENT ILLNESS:     79-year-old male. Presented to the hospital because of nausea vomiting headache. He had generalized malaise. He works as a  worker at [address] [place] [address].  His symptoms worsened and he started having shortness of breath wheezing chest tightness. He was seen in the emergency department with a temperature of 99.6. Patient had a white count of 13.8 no evidence of any bandemia or eosinophilia. Procalcitonin level 0.19. COVID test influenza AB test negative influenza A test positive. Patient did undergo CT scan which revealed no pulm embolism. Reportedly he was on a high flow overnight. We were able to decrease his O2 requirements and currently is on 4 L nasal count with O2 saturation is 97%. He does feel better with breathing treatments. He does have a smoking history but quit back in the mid 90s    He does not follow with a physician on a regular basis. He does not have a medical history. ALLERGIES:  No Known Allergies    HOME MEDICATIONS:  Medications Prior to Admission: magnesium oxide (MAG-OX) 400 (240 Mg) MG tablet, Take 1 tablet by mouth daily      PAST MEDICAL HISTORY:  History reviewed. No pertinent past medical history. PAST SURGICAL HISTORY:  History reviewed. No pertinent surgical history.        SOCIAL HISTORY:  Social History     Socioeconomic History    Marital status:      Spouse name: Not on file    Number of children: Not on file    Years of education: Not on file    Highest education level: Not on file   Occupational History    Not on file   Tobacco Use    Smoking status: Former     Current packs/day: 0.50     Average packs/day: 0.5 packs/day for 5.0 years (2.5 ttl pk-yrs)     Types:

## 2023-12-16 NOTE — ED NOTES
Bedside report given to Carter Thakur RN from ICU. Tamiflu given.  Transferred to 2014 per cart on monitor accompanied by RN in stable condition     Malik Bustillo RN  12/15/23 2100

## 2023-12-16 NOTE — PROGRESS NOTES
Up to bsc for bm and very sob with exertion, pt anxious. Satting low 90'ss pm 4L NC, increased to 5 and called RT Thakur Sessions for requested tx. Residents aware pt anxious and unable to catch breath. Writer asked for something for anxiety. Orders received. Pt back to bed and bp 190/118. 189/109. Receiving treatment.

## 2023-12-16 NOTE — PROGRESS NOTES
12/16/23 1444   Encounter Summary   Encounter Overview/Reason  Initial Encounter   Service Provided For: Patient   Referral/Consult From: Rounding   Complexity of Encounter Low   Spiritual/Emotional needs   Type Spiritual Support   Assessment/Intervention/Outcome   Assessment Unable to assess  (isolation)   Intervention Prayer (assurance of)/[place]

## 2023-12-16 NOTE — ACP (ADVANCE CARE PLANNING)
Advance Care Planning     Advance Care Planning Activator (Inpatient)  Conversation Note      Date of ACP Conversation: 12/16/2023     Conversation Conducted with: Patient with Decision Making Capacity    ACP Activator: Luis Eduardo Martinez RN    Health Care Decision Maker:     Current Designated Health Care Decision Maker:     Click here to complete Healthcare Decision Makers including section of the Healthcare Decision Maker Relationship (ie \"Primary\")  Today we documented Decision Maker(s) consistent with Legal Next of Kin hierarchy. Care Preferences    Ventilation: \"If you were in your present state of health and suddenly became very ill and were unable to breathe on your own, what would your preference be about the use of a ventilator (breathing machine) if it were available to you? \"      Would the patient desire the use of ventilator (breathing machine)?: yes    \"If your health worsens and it becomes clear that your chance of recovery is unlikely, what would your preference be about the use of a ventilator (breathing machine) if it were available to you? \"     Would the patient desire the use of ventilator (breathing machine)?: No      Resuscitation  \"CPR works best to restart the heart when there is a sudden event, like a heart attack, in someone who is otherwise healthy. Unfortunately, CPR does not typically restart the heart for people who have serious health conditions or who are very sick. \"    \"In the event your heart stopped as a result of an underlying serious health condition, would you want attempts to be made to restart your heart (answer \"yes\" for attempt to resuscitate) or would you prefer a natural death (answer \"no\" for do not attempt to resuscitate)? \" yes       [] Yes   [x] No   Educated Patient / Eulalia Dang regarding differences between Advance Directives and portable DNR orders.     Length of ACP Conversation in minutes:      Conversation Outcomes:  ACP discussion completed    Follow-up

## 2023-12-16 NOTE — ED NOTES
ICU called, notified ready for admission. Pt A/OX4 sitting on side of bed, SOB at rest, High Flow O2 continues at 15 lpm, lungs with exp wheezes, diminished. Diaphoretic. Denies c/o at this time. States family aware he is being admitted.       Courtney Tan, RN  12/15/23 2047

## 2023-12-16 NOTE — PROGRESS NOTES
Nutrition Note    Nutrition screen received due to Cultural, Zoroastrianism, or Ethnic Food Preferences - No Pork. Diet order updated. Alexa Cheng R.D., L.D.   Phone: [phone]

## 2023-12-16 NOTE — PROGRESS NOTES
Vancomycin Dosing by Pharmacy - Daily Note   Vancomycin Therapy Day:  2  Indication: sepsis    Allergies:  Patient has no known allergies. Actual Weight:    Wt Readings from Last 1 Encounters:   12/16/23 108 kg (238 lb 1.6 oz)       Labs/Ancillary Data  Estimated Creatinine Clearance: 108 mL/min (based on SCr of 0.9 mg/dL). Recent Labs     12/15/23  1521 12/16/23  0405   CREATININE 1.2 0.9   BUN 16 15   WBC 13.8* 11.6*     Procalcitonin   Date Value Ref Range Status   12/15/2023 0.19 (H) <0.09 ng/mL Final     Comment:           Suspected Sepsis:  <0.50 ng/mL     Low likelihood of sepsis. 0.50-2.00 ng/mL     Increased likelihood of sepsis. Antibiotics encouraged. >2.00 ng/mL     High risk of sepsis/shock. Antibiotics strongly encouraged. Suspected Lower Resp Tract Infections:  <0.24 ng/mL     Low likelihood of bacterial infection. >0.24 ng/mL     Increased likelihood of bacterial infection. Antibiotics encouraged. With successful antibiotic therapy, PCT levels should decrease rapidly. (Half-life of 24 to   36 hours.)        Procalcitonin values from samples collected within the first 6 hours of systemic infection   may still be low. Retesting may be indicated. Values from day 1 and day 4 can be entered into the Change in Procalcitonin Calculator   (www.TickTickTicketss-pct-calculator. com) to determine the patient's Mortality Risk Prognosis        In healthy neonates, plasma Procalcitonin (PCT) concentrations increase gradually after   birth, reaching peak values at about 24 hours of age then decrease to normal values below   0.5 ng/mL by 48-72 hours of age.          Intake/Output Summary (Last 24 hours) at 12/16/2023 0710  Last data filed at 12/16/2023 0700  Gross per 24 hour   Intake 946.37 ml   Output 600 ml   Net 346.37 ml     Temp: 98.1    Culture Date / Source  /  Results  See micro  Recent vancomycin administrations                     vancomycin (VANCOCIN) 2,500 mg in sodium chloride 0.9 % 500 mL 1.79

## 2023-12-16 NOTE — CARE COORDINATION
Case Management Assessment  Initial Evaluation    Date/Time of Evaluation: 12/16/2023 12:55 PM  Assessment Completed by: Jesenia Reyez RN    If patient is discharged prior to next notation, then this note serves as note for discharge by case management. Patient Name: [facility]                   YOB: 1970  Diagnosis: Acute respiratory failure ([address] ) [J96.00]  Influenza A [J10.1]  Acute respiratory failure with hypoxia and hypercapnia ([address] [facility]) [J96.01, J96.02]                   Date / Time: 12/15/2023  2:52 PM    Patient Admission Status: Inpatient   Readmission Risk (Low < 19, Mod (19-27), High > 27): Readmission Risk Score: 10.5    Current PCP: No primary care provider on file. PCP verified by CM? Yes    Chart Reviewed: Yes      History Provided by: Patient  Patient Orientation: Alert and Oriented    Patient Cognition: Alert    Hospitalization in the last 30 days (Readmission):  No    If yes, Readmission Assessment in CM Navigator will be completed. Advance Directives:      Code Status: Full Code   Patient's Primary Decision Maker is: Legal Next of Kin      Discharge Planning:    Patient lives with: Spouse/Significant Other, Children Type of Home: House  Primary Care Giver: Self  Patient Support Systems include: Spouse/Significant Other   Current Financial resources: Medicaid  Current community resources: None  Current services prior to admission: None            Current DME:              Type of Home Care services:  None    ADLS  Prior functional level: Independent in ADLs/IADLs  Current functional level: Independent in ADLs/IADLs    PT AM-PAC:   /24  OT AM-PAC:   /24    Family can provide assistance at DC: Yes  Would you like Case Management to discuss the discharge plan with any other family members/significant others, and if so, who?  No  Plans to Return to Present Housing: Yes  Other Identified Issues/Barriers to RETURNING to current housing: NA  Potential Assistance needed at

## 2023-12-16 NOTE — PROGRESS NOTES
Vancomycin Dosing by Pharmacy - Initial Note   TODAY'S DATE:  12/15/2023  Patient name, age:  Kanu Lim, 46 y.o. Indication: Sepsis of unknown origin, empiric, CAP  Additional antimicrobials:  Cefepime    Allergies:  Patient has no known allergies. Actual Weight:    Wt Readings from Last 1 Encounters:   12/15/23 104.3 kg (230 lb)     Labs/Ancillary Data  Estimated Creatinine Clearance: 80 mL/min (based on SCr of 1.2 mg/dL). Recent Labs     12/15/23  1521   CREATININE 1.2   BUN 16   WBC 13.8*     Procalcitonin   Date Value Ref Range Status   12/15/2023 0.19 (H) <0.09 ng/mL Final     Comment:           Suspected Sepsis:  <0.50 ng/mL     Low likelihood of sepsis. 0.50-2.00 ng/mL     Increased likelihood of sepsis. Antibiotics encouraged. >2.00 ng/mL     High risk of sepsis/shock. Antibiotics strongly encouraged. Suspected Lower Resp Tract Infections:  <0.24 ng/mL     Low likelihood of bacterial infection. >0.24 ng/mL     Increased likelihood of bacterial infection. Antibiotics encouraged. With successful antibiotic therapy, PCT levels should decrease rapidly. (Half-life of 24 to   36 hours.)        Procalcitonin values from samples collected within the first 6 hours of systemic infection   may still be low. Retesting may be indicated. Values from day 1 and day 4 can be entered into the Change in Procalcitonin Calculator   (www.INDOMs-pct-calculator. [employer]) to determine the patient's Mortality Risk Prognosis        In healthy neonates, plasma Procalcitonin (PCT) concentrations increase gradually after   birth, reaching peak values at about 24 hours of age then decrease to normal values below   0.5 ng/mL by 48-72 hours of age.        No intake or output data in the 24 hours ending 12/15/23 2153  Temp: 99.6 F    Recent vancomycin administrations                     vancomycin (VANCOCIN) 2,500 mg in sodium chloride 0.9 % 500 mL IVPB (mg) 2,500 mg New Bag 12/15/23 1822                  Culture Date / Kirstie Julian  /  Results  See micro    MRSA Nasal Swab: N/A. Non-respiratory infection. Marylou Rubio PLAN   Initial loading dose of 25mg/kg (max of 2,500 mg) = 2500  mg  x 1, then  vancomycin 1000 mg IV every 12 hours. Ensured BUN/sCr ordered at baseline and every 48 hours x at least 3 levels, then at least weekly. Vancomycin level ordered for 12/17 @ 0500. Vancomycin Target Concentration Parameters  Treatment  Population Target AUC/TRINITY Target Trough   Invasive MRSA Infection (bacteremia, pneumonia, meningitis, endocarditis, osteomyelitis)  Sepsis (undifferentiated) 400-600 N/A   Infection due to non-MRSA pathogen  Empiric treatment of non-invasive MRSA infection  (SSTI, UTI) <500 10-15 mg/L   CrCl < 29 mL/min  Rapidly fluctuating serum creatinine   GHULAM N/A < 15 mg/L     Renal replacement therapy is dosed by levels, per hospital protocol. Abbreviations  * Pauc: probability that AUC is >400 (efficacy); Pconc: probability that Ctrough is above 20 ?g/mL (toxicity); Tox: Probability of nephrotoxicity, based on Ozzei et al. Clin Infect Dis 2009. Loading dose: 2500 mg at 18:22 12/15/2023. Regimen: 1000 mg IV every 12 hours. Start time: 06:00 on 12/16/2023  Exposure target: AUC24 (range)400-600 mg/L.hr   AUC24,ss: 513 mg/L.hr  Probability of AUC24 > 400: 85 %  Ctrough,ss: 15.9 mg/L  Probability of Ctrough,ss > 20: 22 %  Probability of nephrotoxicity (Ozzie MARY 2009): 11 %    Thank you for the consult. Pharmacy will continue to follow. Aneita Claw Hollie Phoenix. Ph.  12/15/2023  10:02 PM

## 2023-12-16 NOTE — PROGRESS NOTES
Pharmacy Medication History Note      List of current medications patient is taking is complete. Source of information: patient, dispense report, OARRS    Changes made to medication list:  Medications flagged for removal (include reason, ex. noncompliance):  None     Medications removed (include reason, ex. therapy complete or physician discontinued):  None    Medications added/doses adjusted:  Magnesium oxide 400 mg daily     Other notes (ex. Recent course of antibiotics, Coumadin dosing):  Patient denies taking any prescription medications. OARRS negative     Denies use of other OTC or herbal medications.       Allergies clarified    Medication list provided to the patient: no   Medication education provided to the patient: none      Electronically signed by Lauri [facility] on 12/15/2023 at 7:09 PM

## 2023-12-17 LAB
ANION GAP SERPL CALCULATED.3IONS-SCNC: 9 MMOL/L (ref 9–17)
BUN SERPL-MCNC: 15 MG/DL (ref 6–20)
CALCIUM SERPL-MCNC: 8 MG/DL (ref 8.6–10.4)
CHLORIDE SERPL-SCNC: 104 MMOL/L (ref 98–107)
CHOLEST SERPL-MCNC: 168 MG/DL
CHOLESTEROL/HDL RATIO: 4.9
CO2 SERPL-SCNC: 28 MMOL/L (ref 20–31)
CREAT SERPL-MCNC: 0.8 MG/DL (ref 0.7–1.2)
ERYTHROCYTE [DISTWIDTH] IN BLOOD BY AUTOMATED COUNT: 14.4 % (ref 11.5–14.9)
EST. AVERAGE GLUCOSE BLD GHB EST-MCNC: 123 MG/DL
GFR SERPL CREATININE-BSD FRML MDRD: >60 ML/MIN/1.73M2
GLUCOSE SERPL-MCNC: 102 MG/DL (ref 70–99)
HBA1C MFR BLD: 5.9 % (ref 4–6)
HCT VFR BLD AUTO: 39.8 % (ref 41–53)
HDLC SERPL-MCNC: 34 MG/DL
HGB BLD-MCNC: 12.6 G/DL (ref 13.5–17.5)
LDLC SERPL CALC-MCNC: 103 MG/DL (ref 0–130)
MCH RBC QN AUTO: 26 PG (ref 26–34)
MCHC RBC AUTO-ENTMCNC: 31.6 G/DL (ref 31–37)
MCV RBC AUTO: 82.3 FL (ref 80–100)
MICROORGANISM SPEC CULT: NORMAL
MICROORGANISM/AGENT SPEC: NORMAL
MRSA, DNA, NASAL: NEGATIVE
PLATELET # BLD AUTO: 234 K/UL (ref 150–450)
PMV BLD AUTO: 7.6 FL (ref 6–12)
POTASSIUM SERPL-SCNC: 4.1 MMOL/L (ref 3.7–5.3)
PROCALCITONIN SERPL-MCNC: 0.35 NG/ML
RBC # BLD AUTO: 4.83 M/UL (ref 4.5–5.9)
SODIUM SERPL-SCNC: 141 MMOL/L (ref 135–144)
SPECIMEN DESCRIPTION: NORMAL
SPECIMEN DESCRIPTION: NORMAL
TRIGL SERPL-MCNC: 153 MG/DL
WBC OTHER # BLD: 7.1 K/UL (ref 3.5–11)

## 2023-12-17 PROCEDURE — 2060000000 HC ICU INTERMEDIATE R&B

## 2023-12-17 PROCEDURE — 94640 AIRWAY INHALATION TREATMENT: CPT

## 2023-12-17 PROCEDURE — 80048 BASIC METABOLIC PNL TOTAL CA: CPT

## 2023-12-17 PROCEDURE — 94761 N-INVAS EAR/PLS OXIMETRY MLT: CPT

## 2023-12-17 PROCEDURE — 36415 COLL VENOUS BLD VENIPUNCTURE: CPT

## 2023-12-17 PROCEDURE — 6360000002 HC RX W HCPCS: Performed by: INTERNAL MEDICINE

## 2023-12-17 PROCEDURE — 6370000000 HC RX 637 (ALT 250 FOR IP)

## 2023-12-17 PROCEDURE — 6370000000 HC RX 637 (ALT 250 FOR IP): Performed by: EMERGENCY MEDICINE

## 2023-12-17 PROCEDURE — 2700000000 HC OXYGEN THERAPY PER DAY

## 2023-12-17 PROCEDURE — 83036 HEMOGLOBIN GLYCOSYLATED A1C: CPT

## 2023-12-17 PROCEDURE — 6370000000 HC RX 637 (ALT 250 FOR IP): Performed by: INTERNAL MEDICINE

## 2023-12-17 PROCEDURE — 97165 OT EVAL LOW COMPLEX 30 MIN: CPT

## 2023-12-17 PROCEDURE — 99232 SBSQ HOSP IP/OBS MODERATE 35: CPT | Performed by: INTERNAL MEDICINE

## 2023-12-17 PROCEDURE — 84145 PROCALCITONIN (PCT): CPT

## 2023-12-17 PROCEDURE — 85027 COMPLETE CBC AUTOMATED: CPT

## 2023-12-17 PROCEDURE — 80061 LIPID PANEL: CPT

## 2023-12-17 RX ORDER — GUAIFENESIN 600 MG/1
600 TABLET, EXTENDED RELEASE ORAL 2 TIMES DAILY
Status: DISCONTINUED | OUTPATIENT
Start: 2023-12-17 | End: 2023-12-18 | Stop reason: HOSPADM

## 2023-12-17 RX ADMIN — OSELTAMIVIR PHOSPHATE 75 MG: 75 CAPSULE ORAL at 22:21

## 2023-12-17 RX ADMIN — ALBUTEROL SULFATE 2.5 MG: 2.5 SOLUTION RESPIRATORY (INHALATION) at 11:24

## 2023-12-17 RX ADMIN — ENOXAPARIN SODIUM 30 MG: 100 INJECTION SUBCUTANEOUS at 21:51

## 2023-12-17 RX ADMIN — CARVEDILOL 6.25 MG: 6.25 TABLET, FILM COATED ORAL at 17:47

## 2023-12-17 RX ADMIN — ENOXAPARIN SODIUM 30 MG: 100 INJECTION SUBCUTANEOUS at 09:48

## 2023-12-17 RX ADMIN — OSELTAMIVIR PHOSPHATE 75 MG: 75 CAPSULE ORAL at 10:23

## 2023-12-17 RX ADMIN — ALBUTEROL SULFATE 2.5 MG: 2.5 SOLUTION RESPIRATORY (INHALATION) at 19:06

## 2023-12-17 RX ADMIN — ACETAMINOPHEN 650 MG: 325 TABLET ORAL at 15:18

## 2023-12-17 RX ADMIN — ALBUTEROL SULFATE 2.5 MG: 2.5 SOLUTION RESPIRATORY (INHALATION) at 08:00

## 2023-12-17 RX ADMIN — ALBUTEROL SULFATE 2.5 MG: 2.5 SOLUTION RESPIRATORY (INHALATION) at 15:50

## 2023-12-17 RX ADMIN — CARVEDILOL 6.25 MG: 6.25 TABLET, FILM COATED ORAL at 09:48

## 2023-12-17 RX ADMIN — GUAIFENESIN 600 MG: 600 TABLET, EXTENDED RELEASE ORAL at 09:48

## 2023-12-17 RX ADMIN — GUAIFENESIN 600 MG: 600 TABLET, EXTENDED RELEASE ORAL at 21:51

## 2023-12-17 NOTE — PLAN OF CARE
Problem: Discharge Planning  Goal: Discharge to home or other facility with appropriate resources  12/17/2023 1416 by Zoila Gutierrez RN  Outcome: Progressing  Flowsheets (Taken 12/17/2023 0945 by Deisy Alexander, INÉS)  Discharge to home or other facility with appropriate resources: Identify barriers to discharge with patient and caregiver  12/17/2023 0530 by Cary Felix RN  Outcome: Progressing  12/17/2023 0529 by Cary Felix RN  Outcome: Progressing  Flowsheets (Taken 12/16/2023 1655 by Louisa Hughes RN)  Discharge to home or other facility with appropriate resources:   Identify barriers to discharge with patient and caregiver   Arrange for needed discharge resources and transportation as appropriate   Identify discharge learning needs (meds, wound care, etc)     Problem: Skin/Tissue Integrity  Goal: Absence of new skin breakdown  Description: 1. Monitor for areas of redness and/or skin breakdown  2. Assess vascular access sites hourly  3. Every 4-6 hours minimum:  Change oxygen saturation probe site  4. Every 4-6 hours:  If on nasal continuous positive airway pressure, respiratory therapy assess nares and determine need for appliance change or resting period.   12/17/2023 1416 by Zoila Gutierrez RN  Outcome: Progressing  12/17/2023 0530 by Cary Felix RN  Outcome: Progressing  12/17/2023 0529 by Cary Felix RN  Outcome: Progressing     Problem: Safety - Adult  Goal: Free from fall injury  12/17/2023 1416 by Zoila Gutierrez RN  Outcome: Progressing  12/17/2023 0530 by Cary Felix RN  Outcome: Progressing  12/17/2023 0529 by Cary Felix RN  Outcome: Progressing     Problem: Chronic Conditions and Co-morbidities  Goal: Patient's chronic conditions and co-morbidity symptoms are monitored and maintained or improved  12/17/2023 1416 by Zoila Gutierrez RN  Outcome: Progressing  Flowsheets (Taken 12/17/2023 0959 by Deisy Alexander RN)  Care Plan - Patient's Chronic Conditions and Co-Morbidity Symptoms are

## 2023-12-17 NOTE — PLAN OF CARE
Problem: Discharge Planning  Goal: Discharge to home or other facility with appropriate resources  12/17/2023 0530 by Jerrell Pavon RN  Outcome: Progressing  12/17/2023 0529 by Jerrell Pavon RN  Outcome: Progressing  Flowsheets (Taken 12/16/2023 1655 by Geri Gordon RN)  Discharge to home or other facility with appropriate resources:   Identify barriers to discharge with patient and caregiver   Arrange for needed discharge resources and transportation as appropriate   Identify discharge learning needs (meds, wound care, etc)     Problem: Skin/Tissue Integrity  Goal: Absence of new skin breakdown  Description: 1. Monitor for areas of redness and/or skin breakdown  2. Assess vascular access sites hourly  3. Every 4-6 hours minimum:  Change oxygen saturation probe site  4. Every 4-6 hours:  If on nasal continuous positive airway pressure, respiratory therapy assess nares and determine need for appliance change or resting period.   12/17/2023 0530 by Jerrell Pavon RN  Outcome: Progressing  12/17/2023 0529 by Jerrell Pavon RN  Outcome: Progressing     Problem: Safety - Adult  Goal: Free from fall injury  12/17/2023 0530 by Jerrell Pavon RN  Outcome: Progressing  12/17/2023 0529 by Jerrell Pavon RN  Outcome: Progressing     Problem: Chronic Conditions and Co-morbidities  Goal: Patient's chronic conditions and co-morbidity symptoms are monitored and maintained or improved  Outcome: Progressing     Problem: Pain  Goal: Verbalizes/displays adequate comfort level or baseline comfort level  Outcome: Progressing

## 2023-12-18 VITALS
TEMPERATURE: 98.3 F | OXYGEN SATURATION: 95 % | HEIGHT: 66 IN | SYSTOLIC BLOOD PRESSURE: 138 MMHG | WEIGHT: 239.64 LBS | RESPIRATION RATE: 16 BRPM | BODY MASS INDEX: 38.51 KG/M2 | HEART RATE: 82 BPM | DIASTOLIC BLOOD PRESSURE: 95 MMHG

## 2023-12-18 LAB
ANION GAP SERPL CALCULATED.3IONS-SCNC: 9 MMOL/L (ref 9–17)
BUN SERPL-MCNC: 12 MG/DL (ref 6–20)
CALCIUM SERPL-MCNC: 8.4 MG/DL (ref 8.6–10.4)
CHLORIDE SERPL-SCNC: 101 MMOL/L (ref 98–107)
CO2 SERPL-SCNC: 29 MMOL/L (ref 20–31)
CREAT SERPL-MCNC: 0.7 MG/DL (ref 0.7–1.2)
EKG ATRIAL RATE: 101 BPM
EKG P AXIS: 60 DEGREES
EKG P-R INTERVAL: 210 MS
EKG Q-T INTERVAL: 304 MS
EKG QRS DURATION: 76 MS
EKG QTC CALCULATION (BAZETT): 394 MS
EKG R AXIS: 61 DEGREES
EKG T AXIS: 39 DEGREES
EKG VENTRICULAR RATE: 101 BPM
ERYTHROCYTE [DISTWIDTH] IN BLOOD BY AUTOMATED COUNT: 13.7 % (ref 11.5–14.9)
GFR SERPL CREATININE-BSD FRML MDRD: >60 ML/MIN/1.73M2
GLUCOSE SERPL-MCNC: 95 MG/DL (ref 70–99)
HCT VFR BLD AUTO: 39.2 % (ref 41–53)
HGB BLD-MCNC: 12.7 G/DL (ref 13.5–17.5)
M PNEUMO IGM SER QL IA: 0.24
MCH RBC QN AUTO: 26.2 PG (ref 26–34)
MCHC RBC AUTO-ENTMCNC: 32.4 G/DL (ref 31–37)
MCV RBC AUTO: 80.8 FL (ref 80–100)
PLATELET # BLD AUTO: 262 K/UL (ref 150–450)
PMV BLD AUTO: 7.5 FL (ref 6–12)
POTASSIUM SERPL-SCNC: 3.5 MMOL/L (ref 3.7–5.3)
RBC # BLD AUTO: 4.85 M/UL (ref 4.5–5.9)
SODIUM SERPL-SCNC: 139 MMOL/L (ref 135–144)
WBC OTHER # BLD: 5.9 K/UL (ref 3.5–11)

## 2023-12-18 PROCEDURE — 36415 COLL VENOUS BLD VENIPUNCTURE: CPT

## 2023-12-18 PROCEDURE — 85027 COMPLETE CBC AUTOMATED: CPT

## 2023-12-18 PROCEDURE — 6370000000 HC RX 637 (ALT 250 FOR IP): Performed by: EMERGENCY MEDICINE

## 2023-12-18 PROCEDURE — 2580000003 HC RX 258: Performed by: INTERNAL MEDICINE

## 2023-12-18 PROCEDURE — 6370000000 HC RX 637 (ALT 250 FOR IP): Performed by: INTERNAL MEDICINE

## 2023-12-18 PROCEDURE — 80048 BASIC METABOLIC PNL TOTAL CA: CPT

## 2023-12-18 PROCEDURE — 6360000002 HC RX W HCPCS: Performed by: INTERNAL MEDICINE

## 2023-12-18 PROCEDURE — 94640 AIRWAY INHALATION TREATMENT: CPT

## 2023-12-18 PROCEDURE — 99239 HOSP IP/OBS DSCHRG MGMT >30: CPT | Performed by: INTERNAL MEDICINE

## 2023-12-18 PROCEDURE — 6370000000 HC RX 637 (ALT 250 FOR IP)

## 2023-12-18 RX ORDER — CARVEDILOL 6.25 MG/1
6.25 TABLET ORAL 2 TIMES DAILY WITH MEALS
Qty: 60 TABLET | Refills: 3 | Status: SHIPPED | OUTPATIENT
Start: 2023-12-18

## 2023-12-18 RX ORDER — GUAIFENESIN 600 MG/1
600 TABLET, EXTENDED RELEASE ORAL 2 TIMES DAILY
Qty: 30 TABLET | Refills: 0 | Status: SHIPPED | OUTPATIENT
Start: 2023-12-18

## 2023-12-18 RX ORDER — OSELTAMIVIR PHOSPHATE 75 MG/1
75 CAPSULE ORAL 2 TIMES DAILY
Qty: 4 CAPSULE | Refills: 0 | Status: SHIPPED | OUTPATIENT
Start: 2023-12-18 | End: 2023-12-20

## 2023-12-18 RX ORDER — DOXYCYCLINE HYCLATE 100 MG
100 TABLET ORAL 2 TIMES DAILY
Qty: 14 TABLET | Refills: 0 | Status: SHIPPED | OUTPATIENT
Start: 2023-12-18 | End: 2023-12-25

## 2023-12-18 RX ADMIN — CARVEDILOL 6.25 MG: 6.25 TABLET, FILM COATED ORAL at 09:55

## 2023-12-18 RX ADMIN — OSELTAMIVIR PHOSPHATE 75 MG: 75 CAPSULE ORAL at 09:55

## 2023-12-18 RX ADMIN — GUAIFENESIN 600 MG: 600 TABLET, EXTENDED RELEASE ORAL at 09:55

## 2023-12-18 RX ADMIN — SODIUM CHLORIDE, PRESERVATIVE FREE 10 ML: 5 INJECTION INTRAVENOUS at 10:00

## 2023-12-18 RX ADMIN — ENOXAPARIN SODIUM 30 MG: 100 INJECTION SUBCUTANEOUS at 09:56

## 2023-12-18 RX ADMIN — POTASSIUM CHLORIDE 40 MEQ: 1500 TABLET, EXTENDED RELEASE ORAL at 06:25

## 2023-12-18 RX ADMIN — ALBUTEROL SULFATE 2.5 MG: 2.5 SOLUTION RESPIRATORY (INHALATION) at 07:58

## 2023-12-18 RX ADMIN — ALBUTEROL SULFATE 2.5 MG: 2.5 SOLUTION RESPIRATORY (INHALATION) at 12:36

## 2023-12-18 RX ADMIN — ALBUTEROL SULFATE 2.5 MG: 2.5 SOLUTION RESPIRATORY (INHALATION) at 15:36

## 2023-12-18 NOTE — DISCHARGE INSTRUCTIONS
Follow up with your new primary care physician, Dr. Yfn Gruber  in 7 days  Follow up with Dr. Maryam Mitchell (Pulmonology) in 4 weeks  Take all your medication as prescribed:  Take 1 capsule of 75 mg oseltamivir (TAMIFLU) twice daily for 4 doses  Take 1 tablet of 100 mg doxycycline hyclate (VIBRA-TABS) twice daily for 7 days  Take 1 tablet of 6.25 mg carvedilol (COREG) twice daily with meals  If systolic blood pressure is 100 or less, do not take carvedilol (COREG)  If your prescription has refills, obtain the medication refills from the pharmacy before you run out. Seek medical attention if you run out of a medication you need and do not have any refills of.   Reasons to call your doctor or go to the ER: shortness of breath, chest pain, loss of consciousness, or any other concerning symptoms.

## 2023-12-18 NOTE — PROGRESS NOTES
CLINICAL PHARMACY NOTE: MEDS TO BEDS    Total # of Prescriptions Filled: 4   The following medications were delivered to the patient:  Mucus Relief 600mg  Doxycycline 100mg  Olseltamivir 75mg  Carvedilol 6.25    Additional Documentation:  Delivered medications to patients room

## 2023-12-18 NOTE — DISCHARGE INSTR - DIET

## 2023-12-18 NOTE — PLAN OF CARE
Problem: Discharge Planning  Goal: Discharge to home or other facility with appropriate resources  12/18/2023 0316 by Rodney Roldan RN  Outcome: Progressing     Problem: Skin/Tissue Integrity  Goal: Absence of new skin breakdown  Description: 1. Monitor for areas of redness and/or skin breakdown  2. Assess vascular access sites hourly  3. Every 4-6 hours minimum:  Change oxygen saturation probe site  4. Every 4-6 hours:  If on nasal continuous positive airway pressure, respiratory therapy assess nares and determine need for appliance change or resting period.   12/18/2023 0316 by Rodney Roldan RN  Outcome: Progressing     Problem: Safety - Adult  Goal: Free from fall injury  12/18/2023 0316 by Rodney Roldan RN  Outcome: Progressing     Problem: Chronic Conditions and Co-morbidities  Goal: Patient's chronic conditions and co-morbidity symptoms are monitored and maintained or improved  12/18/2023 0316 by Rodney Roldan RN  Outcome: Progressing     Problem: Pain  Goal: Verbalizes/displays adequate comfort level or baseline comfort level  12/18/2023 0316 by Rodney Roldan RN  Outcome: Progressing

## 2023-12-18 NOTE — PLAN OF CARE
Problem: Discharge Planning  Goal: Discharge to home or other facility with appropriate resources  12/18/2023 1322 by Charlee Izquierdo RN  Outcome: Adequate for Discharge     Problem: Skin/Tissue Integrity  Goal: Absence of new skin breakdown  Description: 1. Monitor for areas of redness and/or skin breakdown  2. Assess vascular access sites hourly  3. Every 4-6 hours minimum:  Change oxygen saturation probe site  4. Every 4-6 hours:  If on nasal continuous positive airway pressure, respiratory therapy assess nares and determine need for appliance change or resting period.   12/18/2023 1322 by Charlee Izquierdo RN  Outcome: Adequate for Discharge     Problem: Safety - Adult  Goal: Free from fall injury  12/18/2023 1322 by Charlee Izquierdo RN  Outcome: Adequate for Discharge     Problem: Chronic Conditions and Co-morbidities  Goal: Patient's chronic conditions and co-morbidity symptoms are monitored and maintained or improved  12/18/2023 1322 by Charlee Izquierdo RN  Outcome: Adequate for Discharge     Problem: Pain  Goal: Verbalizes/displays adequate comfort level or baseline comfort level  12/18/2023 1322 by Charlee Izquierdo RN  Outcome: Adequate for Discharge     Problem: ABCDS Injury Assessment  Goal: Absence of physical injury  12/18/2023 1322 by Charlee Izquierdo RN  Outcome: Adequate for Discharge

## 2023-12-18 NOTE — DISCHARGE INSTR - COC
DME TNPM:118769361}  Med Admin  {[facility] DME DCNU:537579888}  Med Delivery   { [facility] MED Delivery:359998111}    Wound Care Documentation and Therapy:        Elimination:  Continence: Bowel: {YES / FQ:76357}  Bladder: {YES / MO:66629}  Urinary Catheter: {Urinary Catheter:770032904}   Colostomy/Ileostomy/Ileal Conduit: {YES / NC:87754}       Date of Last BM: ***    Intake/Output Summary (Last 24 hours) at 2023 1329  Last data filed at 2023 2152  Gross per 24 hour   Intake 2633.15 ml   Output 350 ml   Net 2283.15 ml     I/O last 3 completed shifts: In: 2873.2 [P.O.:240;  I.V.:2633.2]  Out: 800 [Urine:800]    Safety Concerns:     [address] [place] [address] SHA[facility]AM Safety Concerns:431358984}    Impairments/Disabilities:      [address] [place] [address] Impairments/Disabilities:943912452}    Nutrition Therapy:  Current Nutrition Therapy:   [address] [place] [address] [facility]AM Diet List:549624225}    Routes of Feeding: {[facility] DME Other Feedings:264866181}  Liquids: {Slp liquid thickness:54129}  Daily Fluid Restriction: {[facility] DME Yes amt example:583661609}  Last Modified Barium Swallow with Video (Video Swallowing Test): {Done Not Done PFOT:699817235}    Treatments at the Time of Hospital Discharge:   Respiratory Treatments: ***  Oxygen Therapy:  {Therapy; copd oxygen:96671}  Ventilator:    { CC Vent BDQK:546352761}    Rehab Therapies: {THERAPEUTIC INTERVENTION:1204312040}  Weight Bearing Status/Restrictions: [address] [place] [address] CC Weight Bearin}  Other Medical Equipment (for information only, NOT a DME order):  {EQUIPMENT:814499045}  Other Treatments: ***    Patient's personal belongings (please select all that are sent with patient):  {[facility] DME Belongings:898353513}    RN SIGNATURE:  {Esignature:944881357}    CASE MANAGEMENT/SOCIAL WORK SECTION    Inpatient Status Date: ***    Readmission Risk Assessment Score:  Readmission Risk              Risk of Unplanned Readmission:  11           Discharging to Facility/ Agency   Name:   Address:  Phone:  Fax:    Dialysis Facility (if applicable)

## 2023-12-18 NOTE — PROGRESS NOTES
nonproductive cough, respirations easy nonlabored at rest on room air with pulse ox in the high 90s  Abdominal: Soft. Bowel sounds are normal. There is no tenderness. Musculoskeletal: Normal range of motion. Neurological:patient is alert and oriented to person, place, and time. Skin: Skin is warm and dry.    Extremities: No edema   Infusions:      sodium chloride       Meds:     Current Facility-Administered Medications:     guaiFENesin (MUCINEX) extended release tablet 600 mg, 600 mg, Oral, BID, Brand Gisselle Kaminski MD, 600 mg at 12/18/23 7490    hydrALAZINE (APRESOLINE) injection 5 mg, 5 mg, IntraVENous, Q6H PRN, Jaspal Mack MD    carvedilol (COREG) tablet 6.25 mg, 6.25 mg, Oral, BID WC, Jaspal Mack MD, 6.25 mg at 12/18/23 7336    hydrOXYzine HCl (ATARAX) tablet 10 mg, 10 mg, Oral, TID PRN, Maci Pak MD, 10 mg at 12/16/23 1805    albuterol (PROVENTIL) (2.5 MG/3ML) 0.083% nebulizer solution 2.5 mg, 2.5 mg, Nebulization, Q4H While awake, Oliver Portillo MD, 2.5 mg at 12/18/23 1236    sodium chloride flush 0.9 % injection 10 mL, 10 mL, IntraVENous, PRN, Rima Abarca I, DO, 10 mL at 12/15/23 1635    oseltamivir (TAMIFLU) capsule 75 mg, 75 mg, Oral, BID, Rima Abarca I, DO, 75 mg at 12/18/23 0955    sodium chloride flush 0.9 % injection 5-40 mL, 5-40 mL, IntraVENous, 2 times per day, Anant Enrique MD, 10 mL at 12/18/23 1000    sodium chloride flush 0.9 % injection 10 mL, 10 mL, IntraVENous, PRN, Anant Enrique MD    0.9 % sodium chloride infusion, , IntraVENous, PRN, Anant Enrique MD    potassium chloride (KLOR-CON M) extended release tablet 40 mEq, 40 mEq, Oral, PRN, 40 mEq at 12/18/23 0625 **OR** potassium bicarb-citric acid (EFFER-K) effervescent tablet 40 mEq, 40 mEq, Oral, PRN **OR** potassium chloride 10 mEq/100 mL IVPB (Peripheral Line), 10 mEq, IntraVENous, PRN, Anant Enrique MD    magnesium sulfate 1000 mg in dextrose 5% 100 mL IVPB, 1,000 mg, IntraVENous, PRN, Darius Thakur MD    enoxaparin Sodium (LOVENOX) injection 30 mg, 30 mg, SubCUTAneous, BID, Darius Thakur MD, 30 mg at 12/18/23 0956    ondansetron (ZOFRAN-ODT) disintegrating tablet 4 mg, 4 mg, Oral, Q8H PRN **OR** ondansetron (ZOFRAN) injection 4 mg, 4 mg, IntraVENous, Q6H PRN, Darius Thakur MD    polyethylene glycol (GLYCOLAX) packet 17 g, 17 g, Oral, Daily PRN, Darius Thakur MD    acetaminophen (TYLENOL) tablet 650 mg, 650 mg, Oral, Q6H PRN, 650 mg at 12/17/23 1518 **OR** acetaminophen (TYLENOL) suppository 650 mg, 650 mg, Rectal, Q6H PRN, Darius Thakur MD    albuterol (PROVENTIL) (2.5 MG/3ML) 0.083% nebulizer solution 2.5 mg, 2.5 mg, Nebulization, Q2H PRN, Darius Thakur MD, 2.5 mg at 12/16/23 1308    Lab Results:     Lab Results   Component Value Date    WBC 5.9 12/18/2023    HGB 12.7 (L) 12/18/2023    HCT 39.2 (L) 12/18/2023    MCV 80.8 12/18/2023     12/18/2023     Lab Results   Component Value Date    CALCIUM 8.4 (L) 12/18/2023     12/18/2023    K 3.5 (L) 12/18/2023    CO2 29 12/18/2023     12/18/2023    BUN 12 12/18/2023    CREATININE 0.7 12/18/2023     Lab Results   Component Value Date    INR 1.1 12/16/2023    PROTIME 14.5 12/16/2023       Radiology:   No chest imaging today    ASSESSMENT:       Influenza A infection  Acute bronchospasm likely due to viral illness  Acute hypoxic respiratory insufficiency initially requiring high flow nasal cannula, now on room air  No known history of asthma  Non-smoker  Full code  PLAN:   He has been stable on room air  No objection to discharge home from our standpoint  I did discuss to say with Mucinex as well as using the vibratory device at home  He is getting sent home with Tamiflu and doxycycline  If he continues to have some wheezing in several weeks, he may benefit from evaluation for asthma  He can follow-up with us as an outpatient if he continues to have symptoms  Discussed with RN      Electronically

## 2023-12-18 NOTE — PROGRESS NOTES
Physical Therapy        Physical Therapy Cancel Note      DATE: 2023    NAME: Mayur Curiel  MRN: [id number]   : 1970      Patient not seen this date for Physical Therapy due to:    Patient independent with functional mobility. Will defer PT evaluation at this time. Please reorder PT if future needs arise.        Electronically signed by Eleanor Ricketts PT on 2023 at 11:04 AM

## 2023-12-20 LAB
EKG ATRIAL RATE: 126 BPM
EKG P AXIS: 65 DEGREES
EKG P-R INTERVAL: 198 MS
EKG Q-T INTERVAL: 254 MS
EKG QRS DURATION: 88 MS
EKG QTC CALCULATION (BAZETT): 367 MS
EKG R AXIS: 67 DEGREES
EKG T AXIS: -96 DEGREES
EKG VENTRICULAR RATE: 126 BPM
MICROORGANISM SPEC CULT: NORMAL
MICROORGANISM SPEC CULT: NORMAL
SERVICE CMNT-IMP: NORMAL
SERVICE CMNT-IMP: NORMAL
SPECIMEN DESCRIPTION: NORMAL
SPECIMEN DESCRIPTION: NORMAL

## 2023-12-23 NOTE — PROGRESS NOTES
Physician Progress Note      Herminia Lim  CSN #:                  [id number]  :                       1970  ADMIT DATE:       12/15/2023 2:52 PM  [facility] DATE:        2023 4:20 PM  RESPONDING  PROVIDER #:        Edu Oh MD          QUERY TEXT:    Pt admitted with Influenza A. Pt noted to have elevated WBC. If possible,   please document in the progress notes and discharge summary if you are   evaluating and /or treating any of the following: The medical record reflects the following:  Risk Factors: Flu A  Clinical Indicators: Wbc of 13.8, Procal of 0.19 - 0.35, Temp of 99.6 on   admission to as high as 101.4, HR as high as 130, RR as high as 31  Treatment: IVF, Tamiflu, IV Maxipime, IV Vanco    Thank you,  Juan Francisco Mckeon RN  Options provided:  -- Sepsis, present on admission  -- Sepsis was ruled out  -- Other - I will add my own diagnosis  -- Disagree - Not applicable / Not valid  -- Disagree - Clinically unable to determine / Unknown  -- Refer to Clinical Documentation Reviewer    PROVIDER RESPONSE TEXT:    This patient has sepsis which was present on admission. Query created by:  Celina Carlton on 2023 5:51 PM      Electronically signed by:  Edu Oh MD 2023 12:32 PM